# Patient Record
Sex: FEMALE | Race: WHITE
[De-identification: names, ages, dates, MRNs, and addresses within clinical notes are randomized per-mention and may not be internally consistent; named-entity substitution may affect disease eponyms.]

---

## 2017-03-13 PROBLEM — D47.2 GAMMOPATHY: Status: ACTIVE | Noted: 2017-03-13

## 2017-03-16 ENCOUNTER — APPOINTMENT (OUTPATIENT)
Dept: HEMATOLOGY ONCOLOGY | Facility: CLINIC | Age: 62
End: 2017-03-16

## 2017-03-16 VITALS
OXYGEN SATURATION: 98 % | WEIGHT: 146.23 LBS | SYSTOLIC BLOOD PRESSURE: 140 MMHG | DIASTOLIC BLOOD PRESSURE: 77 MMHG | BODY MASS INDEX: 29.48 KG/M2 | HEIGHT: 59 IN | HEART RATE: 74 BPM

## 2017-03-16 DIAGNOSIS — D47.2 MONOCLONAL GAMMOPATHY: ICD-10-CM

## 2017-03-16 DIAGNOSIS — Z86.59 PERSONAL HISTORY OF OTHER MENTAL AND BEHAVIORAL DISORDERS: ICD-10-CM

## 2017-03-16 DIAGNOSIS — Z80.3 FAMILY HISTORY OF MALIGNANT NEOPLASM OF BREAST: ICD-10-CM

## 2017-06-19 ENCOUNTER — APPOINTMENT (OUTPATIENT)
Dept: HEMATOLOGY ONCOLOGY | Facility: CLINIC | Age: 62
End: 2017-06-19

## 2017-07-17 ENCOUNTER — APPOINTMENT (OUTPATIENT)
Dept: HEMATOLOGY ONCOLOGY | Facility: CLINIC | Age: 62
End: 2017-07-17

## 2017-07-17 VITALS
WEIGHT: 128.99 LBS | SYSTOLIC BLOOD PRESSURE: 133 MMHG | BODY MASS INDEX: 26 KG/M2 | DIASTOLIC BLOOD PRESSURE: 69 MMHG | RESPIRATION RATE: 16 BRPM | TEMPERATURE: 98 F | OXYGEN SATURATION: 100 % | HEIGHT: 58.98 IN | HEART RATE: 66 BPM

## 2017-11-06 ENCOUNTER — APPOINTMENT (OUTPATIENT)
Dept: HEMATOLOGY ONCOLOGY | Facility: CLINIC | Age: 62
End: 2017-11-06

## 2017-11-13 ENCOUNTER — APPOINTMENT (OUTPATIENT)
Dept: HEMATOLOGY ONCOLOGY | Facility: CLINIC | Age: 62
End: 2017-11-13
Payer: COMMERCIAL

## 2017-11-13 VITALS
SYSTOLIC BLOOD PRESSURE: 107 MMHG | WEIGHT: 123.99 LBS | HEIGHT: 58.98 IN | TEMPERATURE: 98.4 F | HEART RATE: 70 BPM | DIASTOLIC BLOOD PRESSURE: 50 MMHG | OXYGEN SATURATION: 100 % | BODY MASS INDEX: 25 KG/M2 | RESPIRATION RATE: 16 BRPM

## 2017-11-13 DIAGNOSIS — R63.4 ABNORMAL WEIGHT LOSS: ICD-10-CM

## 2017-11-13 PROCEDURE — 99214 OFFICE O/P EST MOD 30 MIN: CPT

## 2017-11-13 RX ORDER — MOMETASONE 50 UG/1
50 SPRAY, METERED NASAL
Qty: 17 | Refills: 0 | Status: COMPLETED | COMMUNITY
Start: 2017-01-12 | End: 2017-11-13

## 2017-11-13 RX ORDER — AZELASTINE HYDROCHLORIDE 205.5 UG/1
0.15 SPRAY, METERED NASAL
Qty: 30 | Refills: 0 | Status: COMPLETED | COMMUNITY
Start: 2017-01-12 | End: 2017-11-13

## 2017-11-13 RX ORDER — DULOXETINE HYDROCHLORIDE 60 MG/1
60 CAPSULE, DELAYED RELEASE PELLETS ORAL
Qty: 90 | Refills: 0 | Status: COMPLETED | COMMUNITY
Start: 2016-06-28 | End: 2017-11-13

## 2018-03-12 ENCOUNTER — APPOINTMENT (OUTPATIENT)
Dept: HEMATOLOGY ONCOLOGY | Facility: CLINIC | Age: 63
End: 2018-03-12

## 2018-04-09 ENCOUNTER — APPOINTMENT (OUTPATIENT)
Dept: HEMATOLOGY ONCOLOGY | Facility: CLINIC | Age: 63
End: 2018-04-09
Payer: COMMERCIAL

## 2018-04-09 VITALS
SYSTOLIC BLOOD PRESSURE: 125 MMHG | BODY MASS INDEX: 25.6 KG/M2 | RESPIRATION RATE: 16 BRPM | HEIGHT: 58.98 IN | HEART RATE: 65 BPM | DIASTOLIC BLOOD PRESSURE: 70 MMHG | OXYGEN SATURATION: 99 % | TEMPERATURE: 99 F | WEIGHT: 126.99 LBS

## 2018-04-09 PROCEDURE — 99214 OFFICE O/P EST MOD 30 MIN: CPT

## 2018-04-09 RX ORDER — GABAPENTIN 300 MG/1
300 CAPSULE ORAL
Qty: 180 | Refills: 0 | Status: COMPLETED | COMMUNITY
Start: 2016-06-28 | End: 2018-04-09

## 2018-08-27 ENCOUNTER — APPOINTMENT (OUTPATIENT)
Dept: HEMATOLOGY ONCOLOGY | Facility: CLINIC | Age: 63
End: 2018-08-27
Payer: COMMERCIAL

## 2018-08-27 VITALS
SYSTOLIC BLOOD PRESSURE: 125 MMHG | BODY MASS INDEX: 25 KG/M2 | TEMPERATURE: 98.6 F | RESPIRATION RATE: 16 BRPM | HEIGHT: 58.98 IN | OXYGEN SATURATION: 100 % | WEIGHT: 123.99 LBS | HEART RATE: 88 BPM | DIASTOLIC BLOOD PRESSURE: 76 MMHG

## 2018-08-27 PROCEDURE — 99214 OFFICE O/P EST MOD 30 MIN: CPT

## 2018-10-03 ENCOUNTER — APPOINTMENT (OUTPATIENT)
Dept: HEMATOLOGY ONCOLOGY | Facility: CLINIC | Age: 63
End: 2018-10-03
Payer: COMMERCIAL

## 2018-10-03 ENCOUNTER — RESULT REVIEW (OUTPATIENT)
Age: 63
End: 2018-10-03

## 2018-10-03 VITALS
RESPIRATION RATE: 20 BRPM | SYSTOLIC BLOOD PRESSURE: 127 MMHG | OXYGEN SATURATION: 100 % | WEIGHT: 125.99 LBS | HEIGHT: 58.98 IN | TEMPERATURE: 98.4 F | BODY MASS INDEX: 25.4 KG/M2 | HEART RATE: 61 BPM | DIASTOLIC BLOOD PRESSURE: 74 MMHG

## 2018-10-03 PROCEDURE — 99214 OFFICE O/P EST MOD 30 MIN: CPT

## 2018-10-03 RX ORDER — THYROID, PORCINE 180 MG/1
180 TABLET ORAL
Qty: 30 | Refills: 0 | Status: DISCONTINUED | COMMUNITY
Start: 2018-08-25 | End: 2018-10-03

## 2018-10-16 ENCOUNTER — OTHER (OUTPATIENT)
Age: 63
End: 2018-10-16

## 2019-01-03 ENCOUNTER — APPOINTMENT (OUTPATIENT)
Dept: HEMATOLOGY ONCOLOGY | Facility: CLINIC | Age: 64
End: 2019-01-03
Payer: COMMERCIAL

## 2019-01-03 ENCOUNTER — RESULT REVIEW (OUTPATIENT)
Age: 64
End: 2019-01-03

## 2019-01-03 VITALS
SYSTOLIC BLOOD PRESSURE: 113 MMHG | OXYGEN SATURATION: 98 % | WEIGHT: 130 LBS | HEART RATE: 73 BPM | HEIGHT: 58.98 IN | DIASTOLIC BLOOD PRESSURE: 68 MMHG | TEMPERATURE: 98.3 F | BODY MASS INDEX: 26.21 KG/M2 | RESPIRATION RATE: 18 BRPM

## 2019-01-03 PROCEDURE — 99214 OFFICE O/P EST MOD 30 MIN: CPT

## 2019-01-03 NOTE — ASSESSMENT
[FreeTextEntry1] : 1. BCA [id number] 2010- s/p RT, AI>SERm x 5 years - off tx now. Mammogram surveillance- October 2017.  Residual pain in the breast.  Continue yearly surveillance- mammogram 11/2018- [facility]\par \par 2. MGUS - IgG Kappa , M-spike negative, calcium and creatinine normal, mild anemia Hg- 11.5- repeat today\par Now Ifex negative \par \par 3. Mild leukopenia with anemia - chronic, improved Hg ,postpone bone marrow biopsy \par NO infections \par \par 3.Weight loss -stabilized, off thyroid hormones \par \par 4. Depression -ongoing.\par

## 2019-01-03 NOTE — HISTORY OF PRESENT ILLNESS
[Disease: _____________________] : Disease: [unfilled] [T: ___] : T[unfilled] [N: ___] : N[unfilled] [de-identified] : BCA stage 1 lumpectomy 2010 did not tolerant Femara or Arimidex.  Was on tamoxifen until 2015\par MGUS [de-identified] : mammo 2016- November [FreeTextEntry1] : s/p AI>>SERM x 5 years, off tx now [de-identified] : Patient presents today for follow up and management for Breast cancer, MGUS, and anemia.  Patient is feeling well, offers no complaints.  Patient had B/L mammogram and [facility] dated 11/15/18 which was stable and benign appearing bilateral mammogram.

## 2019-01-03 NOTE — PHYSICAL EXAM
[Fully active, able to carry on all pre-disease performance without restriction] : Status 0 - Fully active, able to carry on all pre-disease performance without restriction [Normal] : affect appropriate [de-identified] : left breast, lumpectomy, tender to touch

## 2019-01-03 NOTE — REVIEW OF SYSTEMS
[Fatigue] : fatigue [Negative] : Integumentary [Fever] : no fever [Chills] : no chills [Night Sweats] : no night sweats [Chest Pain] : no chest pain [Palpitations] : no palpitations [Leg Claudication] : no intermittent leg claudication [Lower Ext Edema] : no lower extremity edema [Shortness Of Breath] : no shortness of breath [Wheezing] : no wheezing [Cough] : no cough [Joint Pain] : no joint pain [Joint Stiffness] : no joint stiffness [Muscle Pain] : no muscle pain [Muscle Weakness] : no muscle weakness [Dizziness] : no dizziness [FreeTextEntry2] : chronic fatigue

## 2019-04-11 ENCOUNTER — APPOINTMENT (OUTPATIENT)
Dept: HEMATOLOGY ONCOLOGY | Facility: CLINIC | Age: 64
End: 2019-04-11
Payer: COMMERCIAL

## 2019-05-07 ENCOUNTER — RESULT REVIEW (OUTPATIENT)
Age: 64
End: 2019-05-07

## 2019-05-07 ENCOUNTER — APPOINTMENT (OUTPATIENT)
Dept: HEMATOLOGY ONCOLOGY | Facility: CLINIC | Age: 64
End: 2019-05-07
Payer: COMMERCIAL

## 2019-05-07 VITALS
HEIGHT: 58.98 IN | WEIGHT: 128.99 LBS | BODY MASS INDEX: 26 KG/M2 | HEART RATE: 69 BPM | DIASTOLIC BLOOD PRESSURE: 73 MMHG | OXYGEN SATURATION: 99 % | TEMPERATURE: 99.2 F | SYSTOLIC BLOOD PRESSURE: 117 MMHG | RESPIRATION RATE: 16 BRPM

## 2019-05-07 PROCEDURE — 99214 OFFICE O/P EST MOD 30 MIN: CPT

## 2019-05-07 NOTE — HISTORY OF PRESENT ILLNESS
[de-identified] : mammo 2016- November [de-identified] : BCA stage 1 lumpectomy 2010 did not tolerant Femara or Arimidex.  Was on tamoxifen until 2015\par MGUS [de-identified] : Patient presents today for follow up and management for Breast cancer, MGUS, and anemia.  She is feeling well some fatigue but that is her normal.   [FreeTextEntry1] : s/p AI>>SERM x 5 years, off tx now

## 2019-05-15 ENCOUNTER — OTHER (OUTPATIENT)
Age: 64
End: 2019-05-15

## 2019-09-11 ENCOUNTER — RESULT REVIEW (OUTPATIENT)
Age: 64
End: 2019-09-11

## 2019-09-11 ENCOUNTER — APPOINTMENT (OUTPATIENT)
Dept: HEMATOLOGY ONCOLOGY | Facility: CLINIC | Age: 64
End: 2019-09-11

## 2019-09-11 ENCOUNTER — APPOINTMENT (OUTPATIENT)
Dept: HEMATOLOGY ONCOLOGY | Facility: CLINIC | Age: 64
End: 2019-09-11
Payer: COMMERCIAL

## 2019-09-11 VITALS
HEIGHT: 58.98 IN | RESPIRATION RATE: 20 BRPM | WEIGHT: 128 LBS | OXYGEN SATURATION: 100 % | BODY MASS INDEX: 25.8 KG/M2 | DIASTOLIC BLOOD PRESSURE: 71 MMHG | HEART RATE: 63 BPM | TEMPERATURE: 99.2 F | SYSTOLIC BLOOD PRESSURE: 117 MMHG

## 2019-09-11 PROCEDURE — 99214 OFFICE O/P EST MOD 30 MIN: CPT

## 2019-09-11 NOTE — REVIEW OF SYSTEMS
[Fever] : no fever [Chills] : no chills [Night Sweats] : no night sweats [Chest Pain] : no chest pain [Palpitations] : no palpitations [Leg Claudication] : no intermittent leg claudication [Lower Ext Edema] : no lower extremity edema [Shortness Of Breath] : no shortness of breath [Wheezing] : no wheezing [Cough] : no cough [Joint Pain] : no joint pain [Joint Stiffness] : no joint stiffness [Muscle Pain] : no muscle pain [Muscle Weakness] : no muscle weakness [Dizziness] : no dizziness [FreeTextEntry2] : chronic fatigue

## 2019-09-11 NOTE — ASSESSMENT
[FreeTextEntry1] : 1. BCA [id number] 2010- s/p RT, AI>SERm x 5 years - off tx now. Mammogram surveillance- October 2017.  Residual pain in the breast.  Continue yearly surveillance- mammogram right 5/19- SKYE \par DUe in November \par \par 2. MGUS - IgG Kappa , M-spike negative, calcium and creatinine normal, mild anemia Hg- 11.5- repeat today\par Now Ifex negative - repeat today \par \par 3. Mild leukopenia with anemia - chronic,\par Ferritin 40, intolerant to PO Iron\par - due for colonoscopy\par - IV iron x 1 \par \par 3.Weight loss -stabilized, of thyroid hormones \par \par 4. Depression -ongoing.\par  \par 5. Right axilla - adenopathy - repeat sonogram

## 2019-09-11 NOTE — HISTORY OF PRESENT ILLNESS
[de-identified] : BCA stage 1 lumpectomy 2010 did not tolerant Femara or Arimidex.  Was on tamoxifen until 2015\par MGUS [de-identified] : mammo 2016- November [FreeTextEntry1] : s/p AI>>SERM x 5 years, off tx now [de-identified] : Patient presents today for follow up and management for Breast cancer, MGUS, and anemia.  She is feeling well some fatigue but that is her normal.

## 2019-11-12 ENCOUNTER — OTHER (OUTPATIENT)
Age: 64
End: 2019-11-12

## 2020-01-07 ENCOUNTER — APPOINTMENT (OUTPATIENT)
Dept: HEMATOLOGY ONCOLOGY | Facility: CLINIC | Age: 65
End: 2020-01-07
Payer: COMMERCIAL

## 2020-01-13 ENCOUNTER — RESULT REVIEW (OUTPATIENT)
Age: 65
End: 2020-01-13

## 2020-01-13 ENCOUNTER — APPOINTMENT (OUTPATIENT)
Dept: HEMATOLOGY ONCOLOGY | Facility: CLINIC | Age: 65
End: 2020-01-13
Payer: COMMERCIAL

## 2020-01-13 VITALS
WEIGHT: 131 LBS | OXYGEN SATURATION: 99 % | TEMPERATURE: 99.3 F | RESPIRATION RATE: 16 BRPM | HEIGHT: 58.98 IN | DIASTOLIC BLOOD PRESSURE: 69 MMHG | HEART RATE: 69 BPM | BODY MASS INDEX: 26.41 KG/M2 | SYSTOLIC BLOOD PRESSURE: 117 MMHG

## 2020-01-13 DIAGNOSIS — L65.9 NONSCARRING HAIR LOSS, UNSPECIFIED: ICD-10-CM

## 2020-01-13 DIAGNOSIS — R59.0 LOCALIZED ENLARGED LYMPH NODES: ICD-10-CM

## 2020-01-13 PROCEDURE — 99214 OFFICE O/P EST MOD 30 MIN: CPT

## 2020-01-13 NOTE — REVIEW OF SYSTEMS
[Fatigue] : fatigue [Negative] : Integumentary [Chills] : no chills [Fever] : no fever [Night Sweats] : no night sweats [Palpitations] : no palpitations [Chest Pain] : no chest pain [Leg Claudication] : no intermittent leg claudication [Lower Ext Edema] : no lower extremity edema [Wheezing] : no wheezing [Shortness Of Breath] : no shortness of breath [Cough] : no cough [Joint Pain] : no joint pain [Joint Stiffness] : no joint stiffness [Muscle Pain] : no muscle pain [Dizziness] : no dizziness [Muscle Weakness] : no muscle weakness [FreeTextEntry2] : chronic fatigue

## 2020-01-13 NOTE — HISTORY OF PRESENT ILLNESS
[Disease: _____________________] : Disease: [unfilled] [T: ___] : T[unfilled] [N: ___] : N[unfilled] [de-identified] : BCA stage 1 lumpectomy 2010 did not tolerant Femara or Arimidex.  Was on tamoxifen until 2015\par MGUS [de-identified] : mammo 2016- November [FreeTextEntry1] : s/p AI>>SERM x 5 years, off tx now [de-identified] : Patient presents today for follow up and management for Breast cancer, MGUS, and anemia.  She is feeling well some fatigue but that is her normal. Has no complaints at this time.

## 2020-01-13 NOTE — ASSESSMENT
[FreeTextEntry1] : 1. BCA [id number] 2010- s/p RT, AI>SERm x 5 years - off tx now. Mammogram surveillance- October 2017.  Residual pain in the breast.  Continue yearly surveillance- mammogram right 5/19- SKYE \par December 2019 - SKYE \par \par 2. MGUS - IgG Kappa , M-spike negative, calcium and creatinine normal, mild anemia Hg- 11.5- repeat today\par Now Ifex negative - repeat today \par \par 3. Mild leukopenia with anemia - chronic,\par Ferritin 40, intolerant to PO Iron\par - due for colonoscopy\par - IV iron x 1 \par \par 3.Weight loss -stabilized, of thyroid hormones \par \par 4. Depression -ongoing.\par  \par

## 2020-01-13 NOTE — PHYSICAL EXAM
[Fully active, able to carry on all pre-disease performance without restriction] : Status 0 - Fully active, able to carry on all pre-disease performance without restriction [Normal] : affect appropriate [de-identified] : left breast, lumpectomy, tender to touch

## 2020-05-11 ENCOUNTER — APPOINTMENT (OUTPATIENT)
Dept: HEMATOLOGY ONCOLOGY | Facility: CLINIC | Age: 65
End: 2020-05-11

## 2020-06-22 ENCOUNTER — RESULT REVIEW (OUTPATIENT)
Age: 65
End: 2020-06-22

## 2020-06-22 ENCOUNTER — APPOINTMENT (OUTPATIENT)
Dept: HEMATOLOGY ONCOLOGY | Facility: CLINIC | Age: 65
End: 2020-06-22
Payer: COMMERCIAL

## 2020-06-22 VITALS
SYSTOLIC BLOOD PRESSURE: 129 MMHG | HEART RATE: 64 BPM | TEMPERATURE: 98.1 F | WEIGHT: 127 LBS | RESPIRATION RATE: 18 BRPM | DIASTOLIC BLOOD PRESSURE: 74 MMHG | OXYGEN SATURATION: 100 % | BODY MASS INDEX: 25.6 KG/M2 | HEIGHT: 58.98 IN

## 2020-06-22 DIAGNOSIS — F41.9 ANXIETY DISORDER, UNSPECIFIED: ICD-10-CM

## 2020-06-22 PROCEDURE — 99214 OFFICE O/P EST MOD 30 MIN: CPT

## 2020-06-22 NOTE — HISTORY OF PRESENT ILLNESS
[Disease: _____________________] : Disease: [unfilled] [T: ___] : T[unfilled] [N: ___] : N[unfilled] [de-identified] : BCA stage 1 lumpectomy 2010 did not tolerant Femara or Arimidex.  Was on tamoxifen until 2015\par MGUS [de-identified] : mammo 2016- November [FreeTextEntry1] : s/p AI>>SERM x 5 years, off tx now [de-identified] : Patient presents today for follow up and management for Breast cancer, MGUS, and anemia.  She is reporting ongoing fatigue and swelling to her legs.  She is feeling very anxious about covid and how it has affected her life.

## 2020-06-22 NOTE — ASSESSMENT
[FreeTextEntry1] : 1. BCA A6bKinu 2010- s/p RT, AI>SERm x 5 years - off tx now. Mammogram surveillance- October 2017.  Residual pain in the breast.  Continue yearly surveillance- December 2019 - [facility] \par \par 2. MGUS - IgG Kappa , M-spike negative, calcium and creatinine normal, mild anemia Hg- 12.3\par Now Ifex negative - repeat today \par \par 3. Mild leukopenia with anemia - chronic,\par \par - due for colonoscopy\par - IV iron x 1> ferritin 699\par \par 3.Weight loss -stabilized, off thyroid hormones \par \par 4. Depression/anxiety -ongoing

## 2020-06-22 NOTE — PHYSICAL EXAM
[Fully active, able to carry on all pre-disease performance without restriction] : Status 0 - Fully active, able to carry on all pre-disease performance without restriction [Normal] : affect appropriate [de-identified] : left breast, lumpectomy, tender to touch

## 2020-06-22 NOTE — REVIEW OF SYSTEMS
[Fatigue] : fatigue [Negative] : Musculoskeletal [Palpitations] : palpitations [Shortness Of Breath] : shortness of breath [Joint Pain] : joint pain [SOB on Exertion] : shortness of breath during exertion [Joint Stiffness] : joint stiffness [Anxiety] : anxiety [Fever] : no fever [Chills] : no chills [Night Sweats] : no night sweats [Chest Pain] : no chest pain [Leg Claudication] : no intermittent leg claudication [Wheezing] : no wheezing [Lower Ext Edema] : no lower extremity edema [Cough] : no cough [Muscle Pain] : no muscle pain [Muscle Weakness] : no muscle weakness [Dizziness] : no dizziness [FreeTextEntry2] : chronic fatigue [FreeTextEntry6] : has been getting worse

## 2020-07-09 NOTE — ASSESSMENT
[FreeTextEntry1] : 1. BCA [id number] 2010- s/p RT, AI>SERm x 5 years - off tx now. Mammogram surveillance- October 2017.  Residual pain in the breast.  Continue yearly surveillance- mammogram 11/2018- SKYE\par \par 2. MGUS - IgG Kappa , M-spike negative, calcium and creatinine normal, mild anemia Hg- 11.5- repeat today\par Now Ifex negative \par \par 3. Mild leukopenia with anemia - chronic, improved Hg ,postpone bone marrow biopsy \par NO infections \par \par 3.Weight loss -stabilized, off thyroid hormones \par \par 4. Depression -ongoing.\par  \par 5. Right axilla - adenopathy - repeat sonogram  Patient/Caregiver provided printed discharge information.

## 2020-11-03 DIAGNOSIS — Z00.00 ENCOUNTER FOR GENERAL ADULT MEDICAL EXAMINATION W/OUT ABNORMAL FINDINGS: ICD-10-CM

## 2020-12-21 ENCOUNTER — APPOINTMENT (OUTPATIENT)
Dept: HEMATOLOGY ONCOLOGY | Facility: CLINIC | Age: 65
End: 2020-12-21
Payer: COMMERCIAL

## 2020-12-21 ENCOUNTER — RESULT REVIEW (OUTPATIENT)
Age: 65
End: 2020-12-21

## 2020-12-21 VITALS
HEART RATE: 72 BPM | RESPIRATION RATE: 18 BRPM | BODY MASS INDEX: 27.62 KG/M2 | TEMPERATURE: 97.8 F | DIASTOLIC BLOOD PRESSURE: 83 MMHG | SYSTOLIC BLOOD PRESSURE: 149 MMHG | OXYGEN SATURATION: 99 % | HEIGHT: 58.98 IN | WEIGHT: 137 LBS

## 2020-12-21 DIAGNOSIS — R19.09 OTHER INTRA-ABDOMINAL AND PELVIC SWELLING, MASS AND LUMP: ICD-10-CM

## 2020-12-21 DIAGNOSIS — M79.7 FIBROMYALGIA: ICD-10-CM

## 2020-12-21 PROCEDURE — 99214 OFFICE O/P EST MOD 30 MIN: CPT

## 2020-12-21 PROCEDURE — 99072 ADDL SUPL MATRL&STAF TM PHE: CPT

## 2020-12-21 NOTE — PHYSICAL EXAM
[Fully active, able to carry on all pre-disease performance without restriction] : Status 0 - Fully active, able to carry on all pre-disease performance without restriction [Normal] : affect appropriate [de-identified] : left breast, lumpectomy, tender to touch

## 2020-12-21 NOTE — HISTORY OF PRESENT ILLNESS
[Disease: _____________________] : Disease: [unfilled] [T: ___] : T[unfilled] [N: ___] : N[unfilled] [de-identified] : BCA stage 1 lumpectomy 2010 did not tolerant Femara or Arimidex.  Was on tamoxifen until 2015\par MGUS [de-identified] : mammo 2016- November [FreeTextEntry1] : s/p AI>>SERM x 5 years, off tx now [de-identified] : Patient presents today for follow up and management for Breast cancer, MGUS, and anemia.  She reports feeling fatigued and having some weight gain-\par \par BMD 9/2020-sig decrease in BMD in hip and lumbar spine, osteopenia in lumbar spine and femoral neck

## 2020-12-21 NOTE — ASSESSMENT
[FreeTextEntry1] : 1. BCA U4vJbnc 2010- s/p RT, AI>SERm x 5 years - off tx now. Mammogram surveillance- October 2017.  Residual pain in the breast.  Continue yearly surveillance- December 2019 - SKYE \par Benign mammogram 12/2020\par \par 2. MGUS - IgG Kappa , M-spike negative, calcium and creatinine normal, mild anemia Hg- 12.3\par Now Ifex negative - repeat today \par \par 3. Mild leukopenia with anemia - chronic\par - due for colonoscopy\par - IV iron x 1> ferritin 699\par \par 3.Weight loss -stabilized, off thyroid hormones \par \par 4. Depression/anxiety -ongoing, needs therapy

## 2020-12-21 NOTE — REVIEW OF SYSTEMS
[Fatigue] : fatigue [Palpitations] : palpitations [Shortness Of Breath] : shortness of breath [SOB on Exertion] : shortness of breath during exertion [Joint Pain] : joint pain [Joint Stiffness] : joint stiffness [Anxiety] : anxiety [Negative] : Integumentary [Fever] : no fever [Chills] : no chills [Night Sweats] : no night sweats [Chest Pain] : no chest pain [Leg Claudication] : no intermittent leg claudication [Lower Ext Edema] : no lower extremity edema [Wheezing] : no wheezing [Cough] : no cough [Muscle Pain] : no muscle pain [Muscle Weakness] : no muscle weakness [Dizziness] : no dizziness [FreeTextEntry2] : chronic fatigue [FreeTextEntry6] : has been getting worse

## 2021-01-27 DIAGNOSIS — R59.1 GENERALIZED ENLARGED LYMPH NODES: ICD-10-CM

## 2021-05-17 ENCOUNTER — APPOINTMENT (OUTPATIENT)
Dept: HEMATOLOGY ONCOLOGY | Facility: CLINIC | Age: 66
End: 2021-05-17

## 2021-07-06 ENCOUNTER — APPOINTMENT (OUTPATIENT)
Dept: HEMATOLOGY ONCOLOGY | Facility: CLINIC | Age: 66
End: 2021-07-06
Payer: MEDICARE

## 2021-07-06 ENCOUNTER — RESULT REVIEW (OUTPATIENT)
Age: 66
End: 2021-07-06

## 2021-07-06 VITALS
HEIGHT: 58.98 IN | DIASTOLIC BLOOD PRESSURE: 76 MMHG | TEMPERATURE: 97.8 F | BODY MASS INDEX: 26.41 KG/M2 | SYSTOLIC BLOOD PRESSURE: 121 MMHG | HEART RATE: 72 BPM | RESPIRATION RATE: 16 BRPM | WEIGHT: 131 LBS | OXYGEN SATURATION: 99 %

## 2021-07-06 PROCEDURE — 99214 OFFICE O/P EST MOD 30 MIN: CPT

## 2021-07-06 RX ORDER — AZELASTINE HYDROCHLORIDE AND FLUTICASONE PROPIONATE 137; 50 UG/1; UG/1
137-50 SPRAY, METERED NASAL
Qty: 23 | Refills: 0 | Status: COMPLETED | COMMUNITY
Start: 2020-12-22

## 2021-07-06 RX ORDER — OXYCODONE AND ACETAMINOPHEN 5; 325 MG/1; MG/1
5-325 TABLET ORAL
Qty: 18 | Refills: 0 | Status: COMPLETED | COMMUNITY
Start: 2021-04-14

## 2021-07-06 RX ORDER — LIFITEGRAST 50 MG/ML
5 SOLUTION/ DROPS OPHTHALMIC
Qty: 60 | Refills: 0 | Status: COMPLETED | COMMUNITY
Start: 2018-06-24 | End: 2021-07-06

## 2021-07-06 RX ORDER — ONDANSETRON 4 MG/1
4 TABLET ORAL
Qty: 6 | Refills: 0 | Status: COMPLETED | COMMUNITY
Start: 2021-04-14

## 2021-07-06 RX ORDER — CEPHALEXIN 250 MG/1
250 CAPSULE ORAL
Qty: 28 | Refills: 0 | Status: COMPLETED | COMMUNITY
Start: 2021-04-14

## 2021-07-06 NOTE — REVIEW OF SYSTEMS
[Joint Pain] : joint pain [Joint Stiffness] : joint stiffness [Anxiety] : anxiety [Negative] : Respiratory [Fever] : no fever [Chills] : no chills [Night Sweats] : no night sweats [Fatigue] : no fatigue [Chest Pain] : no chest pain [Palpitations] : no palpitations [Leg Claudication] : no intermittent leg claudication [Lower Ext Edema] : no lower extremity edema [Shortness Of Breath] : no shortness of breath [Wheezing] : no wheezing [Cough] : no cough [SOB on Exertion] : no shortness of breath during exertion [Muscle Pain] : no muscle pain [Muscle Weakness] : no muscle weakness [Dizziness] : no dizziness [FreeTextEntry2] : chronic fatigue

## 2021-07-06 NOTE — HISTORY OF PRESENT ILLNESS
[Disease: _____________________] : Disease: [unfilled] [T: ___] : T[unfilled] [N: ___] : N[unfilled] [de-identified] : BCA stage 1 lumpectomy 2010 did not tolerant Femara or Arimidex.  Was on tamoxifen until 2015\par MGUS [de-identified] : mammo 2016- November [FreeTextEntry1] : s/p AI>>SERM x 5 years, off tx now [de-identified] : Patient presents today for follow up and management for Breast cancer, MGUS, and anemia.  Patient overall feels well with no new complaints. \par BMD 9/2020-sig decrease in BMD in hip and lumbar spine, osteopenia in lumbar spine and femoral neck

## 2021-07-06 NOTE — ASSESSMENT
[FreeTextEntry1] : 1. BCA I6aWjyb 2010- s/p RT, AI>SERm x 5 years - off tx now. Mammogram surveillance- October 2017.  Residual pain in the breast.  Continue yearly surveillance- December 2019 - SKYE \par Benign mammogram 12/2020\par \par 2. MGUS - IgG Kappa , M-spike negative, calcium and creatinine normal, mild anemia Hg- 12.3\par Now Ifex negative\par \par 3. Mild leukopenia with anemia - chronic 3 k \par - due for colonoscopy\par - IV iron x 1> ferritin 699> 436\par \par 3.Weight loss -stabilized, off thyroid hormones \par \par 4. last bone density 12/2019\par T-score -1.6\par Risk factors\par Recommended:\par 1. Vitamin D\par 2. Calcium supplement 500mg\par 3. Weight bearing exercises\par \par \par \par \par \par

## 2021-07-06 NOTE — PHYSICAL EXAM
[Fully active, able to carry on all pre-disease performance without restriction] : Status 0 - Fully active, able to carry on all pre-disease performance without restriction [Normal] : affect appropriate [de-identified] : left breast, lumpectomy, tender to touch

## 2021-12-14 NOTE — REVIEW OF SYSTEMS
[Fever] : no fever [Chills] : no chills [Night Sweats] : no night sweats [Fatigue] : no fatigue [Chest Pain] : no chest pain [Palpitations] : no palpitations [Leg Claudication] : no intermittent leg claudication [Lower Ext Edema] : no lower extremity edema [Shortness Of Breath] : no shortness of breath [Wheezing] : no wheezing [Cough] : no cough [SOB on Exertion] : no shortness of breath during exertion [Joint Pain] : joint pain [Joint Stiffness] : joint stiffness [Muscle Pain] : no muscle pain [Muscle Weakness] : no muscle weakness [Dizziness] : no dizziness [Anxiety] : anxiety [Negative] : Integumentary [FreeTextEntry2] : chronic fatigue

## 2021-12-14 NOTE — HISTORY OF PRESENT ILLNESS
[Disease: _____________________] : Disease: [unfilled] [T: ___] : T[unfilled] [N: ___] : N[unfilled] [de-identified] : BCA stage 1 lumpectomy 2010 did not tolerant Femara or Arimidex.  Was on tamoxifen until 2015\par MGUS [de-identified] : mammo 2016- November [FreeTextEntry1] : s/p AI>>SERM x 5 years, off tx now [de-identified] : Patient presents today for follow up and management for Breast cancer, MGUS, and anemia.  Patient overall feels well with no new complaints. \par BMD 9/2020-sig decrease in BMD in hip and lumbar spine, osteopenia in lumbar spine and femoral neck

## 2021-12-14 NOTE — ASSESSMENT
[FreeTextEntry1] : 1. BCA [id number] 2010- s/p RT, AI>SERm x 5 years - off tx now. Mammogram surveillance- October 2017.  Residual pain in the breast.  Continue yearly surveillance- December 2019 - SKYE \par Benign mammogram 12/2020\par \par 2. MGUS - IgG Kappa , M-spike negative, calcium and creatinine normal, mild anemia Hg- 12.3\par Now Ifex negative\par \par 3. Mild leukopenia with anemia - chronic 3 k \par - due for colonoscopy\par - IV iron x 1> ferritin 699> 436\par \par 3.Weight loss -stabilized, off thyroid hormones \par \par 4. last bone density 12/2019\par T-score -1.6\par Risk factors\par Recommended:\par 1. Vitamin D\par 2. Calcium supplement 500mg\par 3. Weight bearing exercises\par \par \par \par \par \par

## 2021-12-14 NOTE — PHYSICAL EXAM
[Fully active, able to carry on all pre-disease performance without restriction] : Status 0 - Fully active, able to carry on all pre-disease performance without restriction [Normal] : affect appropriate [de-identified] : left breast, lumpectomy, tender to touch

## 2021-12-20 ENCOUNTER — APPOINTMENT (OUTPATIENT)
Dept: HEMATOLOGY ONCOLOGY | Facility: CLINIC | Age: 66
End: 2021-12-20
Payer: MEDICARE

## 2022-01-24 ENCOUNTER — RESULT REVIEW (OUTPATIENT)
Age: 67
End: 2022-01-24

## 2022-01-24 ENCOUNTER — APPOINTMENT (OUTPATIENT)
Dept: HEMATOLOGY ONCOLOGY | Facility: CLINIC | Age: 67
End: 2022-01-24
Payer: MEDICARE

## 2022-01-24 VITALS
BODY MASS INDEX: 28.43 KG/M2 | HEART RATE: 73 BPM | HEIGHT: 58.98 IN | DIASTOLIC BLOOD PRESSURE: 65 MMHG | SYSTOLIC BLOOD PRESSURE: 125 MMHG | OXYGEN SATURATION: 98 % | RESPIRATION RATE: 16 BRPM | WEIGHT: 141 LBS | TEMPERATURE: 98.3 F

## 2022-01-24 DIAGNOSIS — D63.8 ANEMIA IN OTHER CHRONIC DISEASES CLASSIFIED ELSEWHERE: ICD-10-CM

## 2022-01-24 PROCEDURE — 36415 COLL VENOUS BLD VENIPUNCTURE: CPT

## 2022-01-24 PROCEDURE — 99214 OFFICE O/P EST MOD 30 MIN: CPT

## 2022-01-24 NOTE — REVIEW OF SYSTEMS
[Joint Pain] : joint pain [Joint Stiffness] : joint stiffness [Anxiety] : anxiety [Negative] : Integumentary [Fever] : no fever [Chills] : no chills [Night Sweats] : no night sweats [Fatigue] : no fatigue [Chest Pain] : no chest pain [Palpitations] : no palpitations [Leg Claudication] : no intermittent leg claudication [Lower Ext Edema] : no lower extremity edema [Shortness Of Breath] : no shortness of breath [Wheezing] : no wheezing [Cough] : no cough [SOB on Exertion] : no shortness of breath during exertion [Muscle Pain] : no muscle pain [Muscle Weakness] : no muscle weakness [Dizziness] : no dizziness [FreeTextEntry2] : chronic fatigue

## 2022-01-24 NOTE — HISTORY OF PRESENT ILLNESS
[Disease: _____________________] : Disease: [unfilled] [T: ___] : T[unfilled] [N: ___] : N[unfilled] [de-identified] : BCA stage 1 lumpectomy 2010 did not tolerant Femara or Arimidex.  Was on tamoxifen until 2015\par MGUS [de-identified] : mammo 2016- November [FreeTextEntry1] : s/p AI>>SERM x 5 years, off tx now [de-identified] : Patient presents today for follow up and management for Breast cancer, MGUS, and anemia.  Patient overall feels well with no new complaints. \par BMD 9/2020-sig decrease in BMD in hip and lumbar spine, osteopenia in lumbar spine and femoral neck

## 2022-01-24 NOTE — PHYSICAL EXAM
[Fully active, able to carry on all pre-disease performance without restriction] : Status 0 - Fully active, able to carry on all pre-disease performance without restriction [Normal] : affect appropriate [de-identified] : left breast, lumpectomy, tender to touch

## 2022-01-24 NOTE — ASSESSMENT
[FreeTextEntry1] : 1. BCA U3wWpok 2010- s/p RT, AI>SERm x 5 years - off tx now. Mammogram surveillance- October 2017.  \par Residual pain in the breast.   \par Benign mammogram 12/2021, dense breast\par \par 2. MGUS - IgG Kappa , M-spike negative, calcium and creatinine normal, mild anemia Hg- 12.3\par Now Ifex negative. Hip - question of lytic lesion right, compare to 2014 at [facility], contacted radiologist. if different imaging PETCT to be scheduled\par \par 3. Mild leukopenia with anemia - chronic 3 k \par - due for colonoscopy\par - IV iron x 1> ferritin 699> 436\par \par 4.Weight loss -stabilized, off thyroid hormones \par \par 5. last bone density 12/2021\par T-score -1.3, slight improvement \par Risk factors\par Recommended:\par 1. Vitamin D\par 2. Calcium supplement 500mg\par 3. Weight bearing exercises\par \par \par \par \par \par

## 2022-07-25 ENCOUNTER — APPOINTMENT (OUTPATIENT)
Dept: HEMATOLOGY ONCOLOGY | Facility: CLINIC | Age: 67
End: 2022-07-25

## 2022-07-25 ENCOUNTER — RESULT REVIEW (OUTPATIENT)
Age: 67
End: 2022-07-25

## 2022-07-25 VITALS
BODY MASS INDEX: 26.75 KG/M2 | HEART RATE: 70 BPM | WEIGHT: 132.7 LBS | SYSTOLIC BLOOD PRESSURE: 121 MMHG | DIASTOLIC BLOOD PRESSURE: 71 MMHG | TEMPERATURE: 97.7 F | OXYGEN SATURATION: 99 % | RESPIRATION RATE: 18 BRPM | HEIGHT: 58.98 IN

## 2022-07-25 DIAGNOSIS — D64.9 ANEMIA, UNSPECIFIED: ICD-10-CM

## 2022-07-25 DIAGNOSIS — F32.A DEPRESSION, UNSPECIFIED: ICD-10-CM

## 2022-07-25 PROCEDURE — 99214 OFFICE O/P EST MOD 30 MIN: CPT | Mod: 25

## 2022-07-25 PROCEDURE — 36415 COLL VENOUS BLD VENIPUNCTURE: CPT

## 2022-07-25 NOTE — ASSESSMENT
[FreeTextEntry1] : # BCA E0tZcjb 2010- s/p RT, AI>SERm x 5 years - off tx now. Mammogram surveillance- October 2017.  \par Residual pain in the breast.   \par Benign mammogram 12/2021, dense breast\par \par #. MGUS - IgG Kappa , M-spike negative, calcium and creatinine normal, mild anemia Hg- 12.3\par Now Ifex negative. Hip - question of lytic lesion right, compare to 2014 at [facility] [place],\par \par #. Mild leukopenia with anemia - chronic 3 k \par - due for colonoscopy\par - IV iron x 1> ferritin 699> 436\par - no infections\par \par #.Weight loss -stabilized, off thyroid hormones \par \par #. last bone density 12/2021\par T-score -1.3, slight improvement \par Risk factors\par Recommended:\par 1. Vitamin D\par 2. Calcium supplement 500mg\par 3. Weight bearing exercises\par \par \par \par \par \par

## 2022-07-25 NOTE — PHYSICAL EXAM
[Fully active, able to carry on all pre-disease performance without restriction] : Status 0 - Fully active, able to carry on all pre-disease performance without restriction [Normal] : affect appropriate [de-identified] : left breast, lumpectomy, tender to touch

## 2022-07-25 NOTE — HISTORY OF PRESENT ILLNESS
[Disease: _____________________] : Disease: [unfilled] [T: ___] : T[unfilled] [N: ___] : N[unfilled] [de-identified] : BCA stage 1 lumpectomy 2010 did not tolerant Femara or Arimidex.  Was on tamoxifen until 2015\par MGUS [de-identified] : mammo 2016- November [FreeTextEntry1] : s/p AI>>SERM x 5 years, off tx now [de-identified] : Patient presents today for follow up and management for Breast cancer, MGUS, and anemia.  Patient overall feels well with no new complaints. \par BMD 9/2020-sig decrease in BMD in hip and lumbar spine, osteopenia in lumbar spine and femoral neck

## 2023-02-02 ENCOUNTER — RESULT REVIEW (OUTPATIENT)
Age: 68
End: 2023-02-02

## 2023-02-02 ENCOUNTER — APPOINTMENT (OUTPATIENT)
Dept: HEMATOLOGY ONCOLOGY | Facility: CLINIC | Age: 68
End: 2023-02-02
Payer: MEDICARE

## 2023-02-02 VITALS
DIASTOLIC BLOOD PRESSURE: 65 MMHG | HEIGHT: 58.98 IN | SYSTOLIC BLOOD PRESSURE: 133 MMHG | HEART RATE: 68 BPM | WEIGHT: 139.19 LBS | BODY MASS INDEX: 28.06 KG/M2 | OXYGEN SATURATION: 99 % | RESPIRATION RATE: 16 BRPM | TEMPERATURE: 97.4 F

## 2023-02-02 DIAGNOSIS — D72.819 DECREASED WHITE BLOOD CELL COUNT, UNSPECIFIED: ICD-10-CM

## 2023-02-02 PROCEDURE — 99214 OFFICE O/P EST MOD 30 MIN: CPT | Mod: 25

## 2023-02-02 PROCEDURE — 36415 COLL VENOUS BLD VENIPUNCTURE: CPT

## 2023-02-02 RX ORDER — CICLOPIROX OLAMINE 7.7 MG/G
0.77 CREAM TOPICAL
Qty: 15 | Refills: 0 | Status: COMPLETED | COMMUNITY
Start: 2022-05-18 | End: 2023-02-02

## 2023-02-02 RX ORDER — MINOXIDIL 2.5 MG/1
2.5 TABLET ORAL
Refills: 0 | Status: ACTIVE | COMMUNITY

## 2023-02-02 NOTE — HISTORY OF PRESENT ILLNESS
[Disease: _____________________] : Disease: [unfilled] [T: ___] : T[unfilled] [N: ___] : N[unfilled] [de-identified] : BCA stage 1 lumpectomy 2010 did not tolerant Femara or Arimidex.  Was on tamoxifen until 2015\par MGUS [de-identified] : mammo 2016- November [FreeTextEntry1] : s/p AI>>SERM x 5 years, off tx now [de-identified] : Patient presents today for follow up and management for Breast cancer, MGUS, and anemia. She has been deal with intermittent hip pain. \par She has appointment for yearly scans.

## 2023-02-02 NOTE — ASSESSMENT
[FreeTextEntry1] : # BCA A1bFdxp 2010- s/p RT, AI>SERm x 5 years - off tx now. Mammogram surveillance- October 2017.  \par Residual pain in the breast.   \par Benign mammogram 12/2021, dense breast\par \par #. MGUS - IgG Kappa , M-spike negative, calcium and creatinine normal, mild anemia Hg- 12.3\par Now Ifex negative. Hip - question of lytic lesion right, compare to 2014 at [facility],\par \par #. Mild leukopenia with anemia - chronic 3 k \par - due for colonoscopy\par - IV iron x 1> ferritin 699> 436\par - no infections\par \par #.Weight loss -stabilized, off thyroid hormones \par \par #. last bone density 12/2021\par T-score -1.3, slight improvement \par Risk factors\par Recommended:\par 1. Vitamin D\par 2. Calcium supplement 500mg\par 3. Weight bearing exercises\par \par \par \par \par \par

## 2023-02-02 NOTE — PHYSICAL EXAM
[Fully active, able to carry on all pre-disease performance without restriction] : Status 0 - Fully active, able to carry on all pre-disease performance without restriction [Normal] : affect appropriate [de-identified] : left breast, lumpectomy, tender to touch

## 2023-02-02 NOTE — REVIEW OF SYSTEMS
[Joint Pain] : joint pain [Joint Stiffness] : joint stiffness [Anxiety] : anxiety [Negative] : Integumentary [Fever] : no fever [Chills] : no chills [Night Sweats] : no night sweats [Fatigue] : no fatigue [Chest Pain] : no chest pain [Palpitations] : no palpitations [Leg Claudication] : no intermittent leg claudication [Lower Ext Edema] : no lower extremity edema [Shortness Of Breath] : no shortness of breath [Wheezing] : no wheezing [Cough] : no cough [SOB on Exertion] : no shortness of breath during exertion [Muscle Pain] : no muscle pain [Muscle Weakness] : no muscle weakness [Dizziness] : no dizziness

## 2023-10-03 ENCOUNTER — RESULT REVIEW (OUTPATIENT)
Age: 68
End: 2023-10-03

## 2023-10-03 ENCOUNTER — APPOINTMENT (OUTPATIENT)
Dept: HEMATOLOGY ONCOLOGY | Facility: CLINIC | Age: 68
End: 2023-10-03
Payer: MEDICARE

## 2023-10-03 VITALS
BODY MASS INDEX: 29.05 KG/M2 | RESPIRATION RATE: 16 BRPM | HEART RATE: 71 BPM | OXYGEN SATURATION: 98 % | WEIGHT: 138.38 LBS | DIASTOLIC BLOOD PRESSURE: 64 MMHG | TEMPERATURE: 97.9 F | SYSTOLIC BLOOD PRESSURE: 118 MMHG | HEIGHT: 58 IN

## 2023-10-03 DIAGNOSIS — M85.89 OTHER SPECIFIED DISORDERS OF BONE DENSITY AND STRUCTURE, MULTIPLE SITES: ICD-10-CM

## 2023-10-03 DIAGNOSIS — D47.2 MONOCLONAL GAMMOPATHY: ICD-10-CM

## 2023-10-03 DIAGNOSIS — D50.0 IRON DEFICIENCY ANEMIA SECONDARY TO BLOOD LOSS (CHRONIC): ICD-10-CM

## 2023-10-03 DIAGNOSIS — R53.82 CHRONIC FATIGUE, UNSPECIFIED: ICD-10-CM

## 2023-10-03 DIAGNOSIS — C50.812 MALIGNANT NEOPLASM OF OVERLAPPING SITES OF LEFT FEMALE BREAST: ICD-10-CM

## 2023-10-03 DIAGNOSIS — M25.59 PAIN IN OTHER SPECIFIED JOINT: ICD-10-CM

## 2023-10-03 PROCEDURE — 99214 OFFICE O/P EST MOD 30 MIN: CPT

## 2023-10-03 PROCEDURE — 36415 COLL VENOUS BLD VENIPUNCTURE: CPT

## 2024-07-22 NOTE — PHYSICAL EXAM
[Fully active, able to carry on all pre-disease performance without restriction] : Status 0 - Fully active, able to carry on all pre-disease performance without restriction [Normal] : affect appropriate [de-identified] : left breast, lumpectomy, tender to touch

## 2024-07-22 NOTE — ASSESSMENT
[FreeTextEntry1] : # BCA X3zHpbv 2010- s/p RT, AI>SERm x 5 years - off tx now. Mammogram surveillance- October 2017.   Residual pain in the breast.    Benign mammogram 12/2021, dense breast  #. MGUS - IgG Kappa , M-spike negative, calcium and creatinine normal, mild anemia Hg- 12.3 Now Ifex negative. Hip - question of lytic lesion right, compare to 2014 at [facility] [place],  #. Mild leukopenia with anemia - chronic 3 k  - due for colonoscopy - IV iron x 1> ferritin 699> 436 - no infections  #.Weight loss -stabilized, off thyroid hormones   #. last bone density 12/2021 T-score -1.3, slight improvement  Risk factors Recommended: 1. Vitamin D 2. Calcium supplement 500mg 3. Weight bearing exercises

## 2024-07-22 NOTE — PHYSICAL EXAM
[Fully active, able to carry on all pre-disease performance without restriction] : Status 0 - Fully active, able to carry on all pre-disease performance without restriction [Normal] : affect appropriate [de-identified] : left breast, lumpectomy, tender to touch

## 2024-07-22 NOTE — HISTORY OF PRESENT ILLNESS
[Disease: _____________________] : Disease: [unfilled] [T: ___] : T[unfilled] [N: ___] : N[unfilled] [de-identified] : BCA stage 1 lumpectomy 2010 did not tolerant Femara or Arimidex.  Was on tamoxifen until 2015\par  MGUS [de-identified] : mammo 2016- November [FreeTextEntry1] : s/p AI>>SERM x 5 years, off tx now [de-identified] : Patient presents today for follow up and management for Breast cancer, MGUS, and anemia. Patient overall feels well except for severe fatigue.  About 3 months ago she had a hip replacement.

## 2024-07-22 NOTE — PHYSICAL EXAM
[Fully active, able to carry on all pre-disease performance without restriction] : Status 0 - Fully active, able to carry on all pre-disease performance without restriction [Normal] : affect appropriate [de-identified] : left breast, lumpectomy, tender to touch

## 2024-07-22 NOTE — HISTORY OF PRESENT ILLNESS
[Disease: _____________________] : Disease: [unfilled] [T: ___] : T[unfilled] [N: ___] : N[unfilled] [de-identified] : BCA stage 1 lumpectomy 2010 did not tolerant Femara or Arimidex.  Was on tamoxifen until 2015\par  MGUS [de-identified] : mammo 2016- November [FreeTextEntry1] : s/p AI>>SERM x 5 years, off tx now [de-identified] : Patient presents today for follow up and management for Breast cancer, MGUS, and anemia. Patient overall feels well except for severe fatigue.  About 3 months ago she had a hip replacement.

## 2024-07-22 NOTE — REVIEW OF SYSTEMS
"Patient presented with nausea and vomiting  CT chest abdomen pelvis showed-\"  Acute small bowel obstruction with point of transition in the right lower quadrant, likely on the basis of serosal implants versus adhesions.  2.  Stable appearance of right infrahilar lung cancer with findings of lymphangitic carcinomatosis. Stable left lung groundglass and solid nodules likely representing metastases.  3.  Small bilateral pleural effusions with right chest tube in place.  Pathologic left posterior fifth rib fracture with surrounding callus. Right second rib metastasis is better appreciated on the PET/CT   4.  Moderate volume of abdominopelvic ascites with similar findings of peritoneal carcinomatosis.\"  Surgical consult appreciated  NG tube placed in ER  N.p.o. and IV fluids  Pain management as needed  Potassium and magnesium supplementation  Monitor and replace electrolytes as needed  Continue management as per surgical recommendation  " [Fever] : no fever [Chills] : no chills [Night Sweats] : no night sweats [Fatigue] : fatigue [Chest Pain] : no chest pain [Palpitations] : no palpitations [Leg Claudication] : no intermittent leg claudication [Lower Ext Edema] : no lower extremity edema [Shortness Of Breath] : no shortness of breath [Wheezing] : no wheezing [Cough] : no cough [SOB on Exertion] : no shortness of breath during exertion [Joint Pain] : joint pain [Joint Stiffness] : joint stiffness [Muscle Pain] : no muscle pain [Muscle Weakness] : no muscle weakness [Dizziness] : no dizziness [Anxiety] : anxiety [Negative] : Allergic/Immunologic

## 2024-07-22 NOTE — ASSESSMENT
[FreeTextEntry1] : # BCA N8pOhye 2010- s/p RT, AI>SERm x 5 years - off tx now. Mammogram surveillance- October 2017.   Residual pain in the breast.    Benign mammogram 12/2021, dense breast  #. MGUS - IgG Kappa , M-spike negative, calcium and creatinine normal, mild anemia Hg- 12.3 Now Ifex negative. Hip - question of lytic lesion right, compare to 2014 at [facility],  #. Mild leukopenia with anemia - chronic 3 k  - due for colonoscopy - IV iron x 1> ferritin 699> 436 - no infections  #.Weight loss -stabilized, off thyroid hormones   #. last bone density 12/2021 T-score -1.3, slight improvement  Risk factors Recommended: 1. Vitamin D 2. Calcium supplement 500mg 3. Weight bearing exercises

## 2024-07-22 NOTE — PHYSICAL EXAM
[Fully active, able to carry on all pre-disease performance without restriction] : Status 0 - Fully active, able to carry on all pre-disease performance without restriction [Normal] : affect appropriate [de-identified] : left breast, lumpectomy, tender to touch

## 2024-07-22 NOTE — HISTORY OF PRESENT ILLNESS
[Disease: _____________________] : Disease: [unfilled] [T: ___] : T[unfilled] [N: ___] : N[unfilled] [de-identified] : BCA stage 1 lumpectomy 2010 did not tolerant Femara or Arimidex.  Was on tamoxifen until 2015\par  MGUS [de-identified] : mammo 2016- November [FreeTextEntry1] : s/p AI>>SERM x 5 years, off tx now [de-identified] : Patient presents today for follow up and management for Breast cancer, MGUS, and anemia. Patient overall feels well except for severe fatigue.  About 3 months ago she had a hip replacement.

## 2024-07-22 NOTE — ASSESSMENT
[FreeTextEntry1] : # BCA C8wCbbz 2010- s/p RT, AI>SERm x 5 years - off tx now. Mammogram surveillance- October 2017.   Residual pain in the breast.    Benign mammogram 12/2021, dense breast  #. MGUS - IgG Kappa , M-spike negative, calcium and creatinine normal, mild anemia Hg- 12.3 Now Ifex negative. Hip - question of lytic lesion right, compare to 2014 at [facility] [place],  #. Mild leukopenia with anemia - chronic 3 k  - due for colonoscopy - IV iron x 1> ferritin 699> 436 - no infections  #.Weight loss -stabilized, off thyroid hormones   #. last bone density 12/2021 T-score -1.3, slight improvement  Risk factors Recommended: 1. Vitamin D 2. Calcium supplement 500mg 3. Weight bearing exercises

## 2024-07-22 NOTE — REVIEW OF SYSTEMS
[Fever] : no fever [Chills] : no chills [Night Sweats] : no night sweats [Fatigue] : fatigue [Chest Pain] : no chest pain [Palpitations] : no palpitations [Leg Claudication] : no intermittent leg claudication [Lower Ext Edema] : no lower extremity edema [Shortness Of Breath] : no shortness of breath [Wheezing] : no wheezing [Cough] : no cough [SOB on Exertion] : no shortness of breath during exertion [Joint Pain] : joint pain [Joint Stiffness] : joint stiffness [Muscle Pain] : no muscle pain [Muscle Weakness] : no muscle weakness [Dizziness] : no dizziness [Anxiety] : anxiety [Negative] : Allergic/Immunologic

## 2024-07-22 NOTE — HISTORY OF PRESENT ILLNESS
[Disease: _____________________] : Disease: [unfilled] [T: ___] : T[unfilled] [N: ___] : N[unfilled] [de-identified] : BCA stage 1 lumpectomy 2010 did not tolerant Femara or Arimidex.  Was on tamoxifen until 2015\par  MGUS [de-identified] : mammo 2016- November [FreeTextEntry1] : s/p AI>>SERM x 5 years, off tx now [de-identified] : Patient presents today for follow up and management for Breast cancer, MGUS, and anemia. Patient overall feels well except for severe fatigue.  About 3 months ago she had a hip replacement.

## 2024-07-25 ENCOUNTER — RESULT REVIEW (OUTPATIENT)
Age: 69
End: 2024-07-25

## 2024-07-25 ENCOUNTER — APPOINTMENT (OUTPATIENT)
Dept: HEMATOLOGY ONCOLOGY | Facility: CLINIC | Age: 69
End: 2024-07-25
Payer: MEDICARE

## 2024-07-25 ENCOUNTER — APPOINTMENT (OUTPATIENT)
Dept: HEMATOLOGY ONCOLOGY | Facility: CLINIC | Age: 69
End: 2024-07-25

## 2024-07-25 VITALS
DIASTOLIC BLOOD PRESSURE: 70 MMHG | BODY MASS INDEX: 28.59 KG/M2 | WEIGHT: 136.19 LBS | TEMPERATURE: 98.2 F | OXYGEN SATURATION: 99 % | RESPIRATION RATE: 16 BRPM | HEIGHT: 58 IN | SYSTOLIC BLOOD PRESSURE: 138 MMHG | HEART RATE: 75 BPM

## 2024-07-25 DIAGNOSIS — C50.812 MALIGNANT NEOPLASM OF OVERLAPPING SITES OF LEFT FEMALE BREAST: ICD-10-CM

## 2024-07-25 DIAGNOSIS — D72.819 DECREASED WHITE BLOOD CELL COUNT, UNSPECIFIED: ICD-10-CM

## 2024-07-25 DIAGNOSIS — F41.9 ANXIETY DISORDER, UNSPECIFIED: ICD-10-CM

## 2024-07-25 DIAGNOSIS — D47.2 MONOCLONAL GAMMOPATHY: ICD-10-CM

## 2024-07-25 DIAGNOSIS — R53.82 CHRONIC FATIGUE, UNSPECIFIED: ICD-10-CM

## 2024-07-25 PROCEDURE — 99214 OFFICE O/P EST MOD 30 MIN: CPT

## 2024-07-25 PROCEDURE — 36415 COLL VENOUS BLD VENIPUNCTURE: CPT

## 2024-07-25 NOTE — BEGINNING OF VISIT
[0] : 2) Feeling down, depressed, or hopeless: Not at all (0) [PHQ-2 Negative] : PHQ-2 Negative [[id number]] : 0 [Pain Scale: ___] : On a scale of 1-10, today the patient's pain is a(n) [unfilled]. [Never] : Never [Date Discussed (MM/DD/YY): ___] : Discussed: [unfilled] [With Patient/Caregiver] : with Patient/Caregiver

## 2024-07-25 NOTE — BEGINNING OF VISIT
[0] : 2) Feeling down, depressed, or hopeless: Not at all (0) [PHQ-2 Negative] : PHQ-2 Negative [[id number]] : 0 [Pain Scale: ___] : On a scale of 1-10, today the patient's pain is a(n) [unfilled]. [Never] : Never [Date Discussed (MM/DD/YY): ___] : Discussed: [unfilled] [With Patient/Caregiver] : with Patient/Caregiver

## 2024-07-25 NOTE — BEGINNING OF VISIT
[0] : 2) Feeling down, depressed, or hopeless: Not at all (0) [PHQ-2 Negative] : PHQ-2 Negative [[id number]] : 0 [Pain Scale: ___] : On a scale of 1-10, today the patient's pain is a(n) [unfilled]. [Never] : Never [Date Discussed (MM/DD/YY): ___] : Discussed: [unfilled] [With Patient/Caregiver] : with Patient/Caregiver

## 2024-07-25 NOTE — BEGINNING OF VISIT
[0] : 2) Feeling down, depressed, or hopeless: Not at all (0) [PHQ-2 Negative] : PHQ-2 Negative [[id number]] : 0 [Pain Scale: ___] : On a scale of 1-10, today the patient's pain is a(n) [unfilled]. [Never] : Never [Date Discussed (MM/DD/YY): ___] : Discussed: [unfilled] [With Patient/Caregiver] : with Patient/Caregiver

## 2025-04-24 ENCOUNTER — RESULT REVIEW (OUTPATIENT)
Age: 70
End: 2025-04-24

## 2025-04-24 ENCOUNTER — APPOINTMENT (OUTPATIENT)
Dept: HEMATOLOGY ONCOLOGY | Facility: CLINIC | Age: 70
End: 2025-04-24
Payer: MEDICARE

## 2025-04-24 VITALS
HEART RATE: 68 BPM | WEIGHT: 140.56 LBS | HEIGHT: 58 IN | OXYGEN SATURATION: 100 % | SYSTOLIC BLOOD PRESSURE: 132 MMHG | TEMPERATURE: 97.6 F | BODY MASS INDEX: 29.51 KG/M2 | DIASTOLIC BLOOD PRESSURE: 63 MMHG | RESPIRATION RATE: 16 BRPM

## 2025-04-24 DIAGNOSIS — D72.819 DECREASED WHITE BLOOD CELL COUNT, UNSPECIFIED: ICD-10-CM

## 2025-04-24 DIAGNOSIS — M85.89 OTHER SPECIFIED DISORDERS OF BONE DENSITY AND STRUCTURE, MULTIPLE SITES: ICD-10-CM

## 2025-04-24 DIAGNOSIS — C50.812 MALIGNANT NEOPLASM OF OVERLAPPING SITES OF LEFT FEMALE BREAST: ICD-10-CM

## 2025-04-24 PROCEDURE — 99214 OFFICE O/P EST MOD 30 MIN: CPT

## 2025-04-24 PROCEDURE — 36415 COLL VENOUS BLD VENIPUNCTURE: CPT
